# Patient Record
Sex: MALE | Race: BLACK OR AFRICAN AMERICAN
[De-identification: names, ages, dates, MRNs, and addresses within clinical notes are randomized per-mention and may not be internally consistent; named-entity substitution may affect disease eponyms.]

---

## 2021-04-30 ENCOUNTER — HOSPITAL ENCOUNTER (EMERGENCY)
Dept: HOSPITAL 54 - ER | Age: 51
Discharge: HOME | End: 2021-04-30
Payer: COMMERCIAL

## 2021-04-30 VITALS — HEIGHT: 73 IN | BODY MASS INDEX: 25.98 KG/M2 | WEIGHT: 196 LBS

## 2021-04-30 VITALS — DIASTOLIC BLOOD PRESSURE: 93 MMHG | SYSTOLIC BLOOD PRESSURE: 139 MMHG

## 2021-04-30 DIAGNOSIS — H66.92: Primary | ICD-10-CM

## 2021-04-30 DIAGNOSIS — Z79.899: ICD-10-CM

## 2021-04-30 DIAGNOSIS — H61.22: ICD-10-CM

## 2021-04-30 NOTE — NUR
Pt bibself c/o left ear pain s/p swimming. Pt aaox4 breathing evenly and 
unlabored. Pt states "my ear started hurting after i went swimming in the pool 
yesterday" Pt attached to monitor and pox. Md at bedside for eval. Pt given 
blanket and call light within reach.